# Patient Record
Sex: FEMALE | URBAN - METROPOLITAN AREA
[De-identification: names, ages, dates, MRNs, and addresses within clinical notes are randomized per-mention and may not be internally consistent; named-entity substitution may affect disease eponyms.]

---

## 2023-03-15 DIAGNOSIS — G47.9 SLEEP DISORDER: Primary | ICD-10-CM

## 2023-03-15 RX ORDER — ESZOPICLONE 3 MG/1
3 TABLET, FILM COATED ORAL NIGHTLY
COMMUNITY
End: 2023-03-15 | Stop reason: SDUPTHER

## 2023-03-15 NOTE — TELEPHONE ENCOUNTER
----- Message from Shea Bennett sent at 3/15/2023  2:34 PM CDT -----  Contact: GURDEEP DUDLEY [[id number]]  Type:  RX Refill Request    Who Called: GURDEEP DUDLEY [[id number]]  Refill or New Rx: Refill  RX Name and Strength: Lunesta, patient thinks she takes 3mg  How is the patient currently taking it? (ex. 1XDay): Take 1 tab by mouth every evening before bed  Is this a 30 day or 90 day RX: 90  Preferred Pharmacy with phone number: [facility], [address], [place] [address], [phone]  Local or Mail Order: Local  Would the patient rather a call back or a response via [facility]? Phone call   Best Call Back Number: [phone]  Additional Information: Patient was previously established through Dr. Monterroso's private practice. Scheduled to re-est care 7/24/23. Patient was at work at the time of the call, could not remember the prescription details.

## 2023-03-16 RX ORDER — ESZOPICLONE 3 MG/1
3 TABLET, FILM COATED ORAL NIGHTLY
Qty: 90 TABLET | Refills: 1 | Status: SHIPPED | OUTPATIENT
Start: 2023-03-16

## 2023-05-26 ENCOUNTER — TELEPHONE (OUTPATIENT)
Dept: PRIMARY CARE CLINIC | Facility: CLINIC | Age: 58
End: 2023-05-26
Payer: COMMERCIAL

## 2023-05-26 NOTE — TELEPHONE ENCOUNTER
----- Message from Lilo Duran sent at 5/26/2023  9:47 AM CDT -----  Regarding: Questions and concerns  Contact: [phone]  Patient Elizabeth is calling. Need a refill on Fluoxetine 40mg called into the pharm. Please call patient at [phone]    [facility] [phone]

## 2023-05-29 RX ORDER — FLUOXETINE HYDROCHLORIDE 40 MG/1
40 CAPSULE ORAL DAILY
Qty: 30 CAPSULE | Refills: 5 | Status: SHIPPED | OUTPATIENT
Start: 2023-05-29 | End: 2023-06-28

## 2023-05-31 DIAGNOSIS — Z12.31 OTHER SCREENING MAMMOGRAM: ICD-10-CM

## 2023-09-28 DIAGNOSIS — G47.9 SLEEP DISORDER: ICD-10-CM

## 2023-09-28 NOTE — TELEPHONE ENCOUNTER
----- Message from Anna Wright sent at 9/28/2023 10:12 AM CDT -----  e:  RX Refill Request        Who Called: pt        RX Name and Strength: eszopiclone (LUNESTA) 3 mg Tab        How is the patient currently taking it? (ex. 1XDay): 1 X Day        Is this a 30 day or 90 day RX: 90      Preferred Pharmacy with phone number: [address] (Ph: [phone])        Local or Mail Order: local        Ordering Provider: Flood        Would the patient rather a call back or a response via [facility]? call        Best Call Back Number: [phone]        Additional Information:call back

## 2023-09-29 ENCOUNTER — PATIENT MESSAGE (OUTPATIENT)
Dept: PRIMARY CARE CLINIC | Facility: CLINIC | Age: 58
End: 2023-09-29
Payer: COMMERCIAL

## 2023-09-29 RX ORDER — ESZOPICLONE 3 MG/1
3 TABLET, FILM COATED ORAL NIGHTLY
Qty: 90 TABLET | Refills: 1 | OUTPATIENT
Start: 2023-09-29

## 2023-10-03 ENCOUNTER — PATIENT MESSAGE (OUTPATIENT)
Dept: PRIMARY CARE CLINIC | Facility: CLINIC | Age: 58
End: 2023-10-03
Payer: COMMERCIAL

## 2023-10-03 DIAGNOSIS — G47.9 SLEEP DISORDER: ICD-10-CM

## 2023-10-03 RX ORDER — ESZOPICLONE 3 MG/1
3 TABLET, FILM COATED ORAL NIGHTLY
Qty: 90 TABLET | Refills: 1 | OUTPATIENT
Start: 2023-10-03

## 2024-04-23 ENCOUNTER — TELEPHONE (OUTPATIENT)
Dept: FAMILY MEDICINE | Facility: CLINIC | Age: 59
End: 2024-04-23
Payer: COMMERCIAL

## 2025-03-11 ENCOUNTER — TELEPHONE (OUTPATIENT)
Dept: INTERNAL MEDICINE | Facility: CLINIC | Age: 60
End: 2025-03-11
Payer: COMMERCIAL

## 2025-03-11 NOTE — TELEPHONE ENCOUNTER
----- Message from Tierney sent at 3/11/2025  9:33 AM CDT -----  Contact: [phone]  [email or web address] Patient is calling for Medical Advice regarding:weight loss appt How long has patient had these symptoms:Pharmacy name and phone#:Patient wants a call back or thru [facility]:call back Comments:She is asking for an appt for this pelase advise Please advise patient replies from provider may take up to 48 hours.

## 2025-03-11 NOTE — TELEPHONE ENCOUNTER
Spoke with pt; pt was calling to establish with provider and to discuss weight loss; MA advised pt Dr Shannon next opening was 04/28 but did offer mid level and other MD sooner appts; pt stated she will have to think about it & callback /LD